# Patient Record
Sex: MALE | Employment: OTHER | URBAN - NONMETROPOLITAN AREA
[De-identification: names, ages, dates, MRNs, and addresses within clinical notes are randomized per-mention and may not be internally consistent; named-entity substitution may affect disease eponyms.]

---

## 2019-03-11 ENCOUNTER — NON-PROVIDER VISIT (OUTPATIENT)
Dept: URGENT CARE | Facility: PHYSICIAN GROUP | Age: 64
End: 2019-03-11
Payer: COMMERCIAL

## 2019-03-11 ENCOUNTER — TELEPHONE (OUTPATIENT)
Dept: URGENT CARE | Facility: PHYSICIAN GROUP | Age: 64
End: 2019-03-11

## 2019-03-11 ENCOUNTER — OFFICE VISIT (OUTPATIENT)
Dept: URGENT CARE | Facility: PHYSICIAN GROUP | Age: 64
End: 2019-03-11
Payer: COMMERCIAL

## 2019-03-11 ENCOUNTER — APPOINTMENT (OUTPATIENT)
Dept: RADIOLOGY | Facility: IMAGING CENTER | Age: 64
End: 2019-03-11
Attending: PHYSICIAN ASSISTANT
Payer: COMMERCIAL

## 2019-03-11 VITALS
SYSTOLIC BLOOD PRESSURE: 138 MMHG | TEMPERATURE: 97.9 F | OXYGEN SATURATION: 96 % | WEIGHT: 196 LBS | BODY MASS INDEX: 28.06 KG/M2 | HEIGHT: 70 IN | DIASTOLIC BLOOD PRESSURE: 82 MMHG | RESPIRATION RATE: 14 BRPM | HEART RATE: 100 BPM

## 2019-03-11 DIAGNOSIS — G89.29 CHRONIC PAIN OF LEFT KNEE: ICD-10-CM

## 2019-03-11 DIAGNOSIS — M25.562 CHRONIC PAIN OF LEFT KNEE: ICD-10-CM

## 2019-03-11 PROCEDURE — 73562 X-RAY EXAM OF KNEE 3: CPT | Mod: TC,FY,LT | Performed by: PHYSICIAN ASSISTANT

## 2019-03-11 PROCEDURE — 99204 OFFICE O/P NEW MOD 45 MIN: CPT | Performed by: PHYSICIAN ASSISTANT

## 2019-03-11 RX ORDER — MELOXICAM 15 MG/1
15 TABLET ORAL DAILY
Qty: 30 TAB | Refills: 0 | Status: SHIPPED | OUTPATIENT
Start: 2019-03-11 | End: 2019-07-31

## 2019-03-11 NOTE — PROGRESS NOTES
Chief Complaint   Patient presents with   • Knee Pain       HISTORY OF PRESENT ILLNESS: Patient is a 64 y.o. male who presents today because he has had left knee pain waxing and waning for the last several weeks.  He does report chronic knee pain intermittently for many years.  No recent trauma to the area.  Denies any distal paresthesias.  He tried some Aleve which did not help.  He has pain with extension and with flexion    There are no active problems to display for this patient.      Allergies:Lisinopril    Current Outpatient Prescriptions Ordered in [facility]   Medication Sig Dispense Refill   • meloxicam (MOBIC) 15 MG tablet Take 1 Tab by mouth every day. 30 Tab 0   • fluoxetine (PROZAC) 10 MG Cap Take 10 mg by mouth every day.     • losartan-hydrochlorothiazide (HYZAAR) 100-12.5 MG per tablet Take 1 Tab by mouth every day.     • metoprolol SR (TOPROL XL) 50 MG TABLET SR 24 HR Take 50 mg by mouth every day.     • buPROPion (WELLBUTRIN XL) 300 MG XL tablet Take 300 mg by mouth every morning.     • ibuprofen (MOTRIN) 800 MG Tab Take 800 mg by mouth every 8 hours as needed.     • naproxen (NAPROSYN) 250 MG Tab Take 250 mg by mouth 2 times a day, with meals.     • aspirin EC (ECOTRIN) 81 MG Tablet Delayed Response Take 81 mg by mouth every day.     • diphenhydrAMINE (BENADRYL) 25 MG Tab Take 25 mg by mouth every 6 hours as needed for Sleep.     • ranitidine (ZANTAC) 150 MG Tab Take 150 mg by mouth 2 times a day.       No current [facility]-ordered facility-administered medications on file.        Past Medical History:   Diagnosis Date   • Depression    • GERD (gastroesophageal reflux disease)    • Hypertension        Social History   Substance Use Topics   • Smoking status: Former Smoker   • Smokeless tobacco: Never Used   • Alcohol use Not on file       Family Status   Relation Status   • Mo    • Fa    No family history on file.    ROS:  Review of Systems   Constitutional: Negative for fever, chills, weight  "loss and malaise/fatigue.   HENT: Negative for ear pain, nosebleeds, congestion, sore throat and neck pain.    Eyes: Negative for blurred vision.   Respiratory: Negative for cough, sputum production, shortness of breath and wheezing.    Cardiovascular: Negative for chest pain, palpitations, orthopnea and leg swelling.   Gastrointestinal: Negative for heartburn, nausea, vomiting and abdominal pain.   Genitourinary: Negative for dysuria, urgency and frequency.     Exam:  Blood pressure 138/82, pulse 100, temperature 36.6 °C (97.9 °F), temperature source Temporal, resp. rate 14, height 1.778 m (5' 10\"), weight 88.9 kg (196 lb), SpO2 96 %.  General:  Well nourished, well developed male in NAD  Head:Normocephalic, atraumatic  Eyes: PERRLA, EOM within normal limits, no conjunctival injection, no scleral icterus, visual fields and acuity grossly intact.  Extremities: no clubbing, cyanosis, or edema.  Left knee is without any visual deformity, erythema, edema or ecchymosis.  He has some tenderness to the joint line anteriorly and medially.  He has good distal circulation and sensation, somewhat reduced range of motion with extension secondary to pain.    Please note that this dictation was created using voice recognition software. I have made every reasonable attempt to correct obvious errors, but I expect that there are errors of grammar and possibly content that I did not discover before finalizing the note.    Assessment/Plan:  1. Chronic pain of left knee  DX-KNEE 3 VIEWS LEFT    meloxicam (MOBIC) 15 MG tablet       Followup with primary care in the next 7-10 days if not significantly improving, return to the urgent care or go to the emergency room sooner for any worsening of symptoms.       "

## 2019-04-29 ENCOUNTER — OFFICE VISIT (OUTPATIENT)
Dept: URGENT CARE | Facility: PHYSICIAN GROUP | Age: 64
End: 2019-04-29
Payer: COMMERCIAL

## 2019-04-29 VITALS
RESPIRATION RATE: 16 BRPM | BODY MASS INDEX: 28.06 KG/M2 | HEART RATE: 84 BPM | TEMPERATURE: 97.6 F | SYSTOLIC BLOOD PRESSURE: 134 MMHG | OXYGEN SATURATION: 99 % | DIASTOLIC BLOOD PRESSURE: 88 MMHG | HEIGHT: 70 IN | WEIGHT: 196 LBS

## 2019-04-29 DIAGNOSIS — G89.29 CHRONIC PAIN OF LEFT KNEE: ICD-10-CM

## 2019-04-29 DIAGNOSIS — M25.562 CHRONIC PAIN OF LEFT KNEE: ICD-10-CM

## 2019-04-29 PROCEDURE — 99214 OFFICE O/P EST MOD 30 MIN: CPT | Performed by: PHYSICIAN ASSISTANT

## 2019-04-29 RX ORDER — MELOXICAM 15 MG/1
15 TABLET ORAL DAILY
Qty: 30 TAB | Refills: 0 | Status: SHIPPED | OUTPATIENT
Start: 2019-04-29 | End: 2019-07-31

## 2019-04-29 NOTE — PROGRESS NOTES
Chief Complaint   Patient presents with   • Knee Pain       HISTORY OF PRESENT ILLNESS: Patient is a 64 y.o. male who presents today because he has chronic pain of his left knee.  He has been seen for this about a month ago, prescribed Mobic which seemed to help.  He also had an x-ray which showed:    1.  There is degenerative spurring along the anterior superior aspect of the patella at the quadriceps attachment.    He reports that the Mobic did help at some point but he would like to see an orthopedic physician for further evaluation and treatment    There are no active problems to display for this patient.      Allergies:Lisinopril    Current Outpatient Prescriptions Ordered in [facility]   Medication Sig Dispense Refill   • meloxicam (MOBIC) 15 MG tablet Take 1 Tab by mouth every day. 30 Tab 0   • meloxicam (MOBIC) 15 MG tablet Take 1 Tab by mouth every day. 30 Tab 0   • fluoxetine (PROZAC) 10 MG Cap Take 10 mg by mouth every day.     • losartan-hydrochlorothiazide (HYZAAR) 100-12.5 MG per tablet Take 1 Tab by mouth every day.     • ibuprofen (MOTRIN) 800 MG Tab Take 800 mg by mouth every 8 hours as needed.     • naproxen (NAPROSYN) 250 MG Tab Take 250 mg by mouth 2 times a day, with meals.     • metoprolol SR (TOPROL XL) 50 MG TABLET SR 24 HR Take 50 mg by mouth every day.     • aspirin EC (ECOTRIN) 81 MG Tablet Delayed Response Take 81 mg by mouth every day.     • buPROPion (WELLBUTRIN XL) 300 MG XL tablet Take 300 mg by mouth every morning.     • diphenhydrAMINE (BENADRYL) 25 MG Tab Take 25 mg by mouth every 6 hours as needed for Sleep.     • ranitidine (ZANTAC) 150 MG Tab Take 150 mg by mouth 2 times a day.       No current [facility]-ordered facility-administered medications on file.        Past Medical History:   Diagnosis Date   • Depression    • GERD (gastroesophageal reflux disease)    • Hypertension        Social History   Substance Use Topics   • Smoking status: Former Smoker   • Smokeless tobacco: Never Used   •  "Alcohol use Not on file       Family Status   Relation Status   • Mo    • Fa    No family history on file.    ROS:  Review of Systems   Constitutional: Negative for fever, chills, weight loss and malaise/fatigue.   HENT: Negative for ear pain, nosebleeds, congestion, sore throat and neck pain.    Eyes: Negative for blurred vision.   Respiratory: Negative for cough, sputum production, shortness of breath and wheezing.    Cardiovascular: Negative for chest pain, palpitations, orthopnea and leg swelling.   Gastrointestinal: Negative for heartburn, nausea, vomiting and abdominal pain.   Genitourinary: Negative for dysuria, urgency and frequency.     Exam:  /88 (BP Location: Right arm, Patient Position: Sitting, BP Cuff Size: Adult)   Pulse 84   Temp 36.4 °C (97.6 °F) (Temporal)   Resp 16   Ht 1.778 m (5' 10\")   Wt 88.9 kg (196 lb)   SpO2 99%   General:  Well nourished, well developed male in NAD  Head:Normocephalic, atraumatic  Eyes: PERRLA, EOM within normal limits, no conjunctival injection, no scleral icterus, visual fields and acuity grossly intact.  Extremities: no clubbing, cyanosis, or edema.    Please note that this dictation was created using voice recognition software. I have made every reasonable attempt to correct obvious errors, but I expect that there are errors of grammar and possibly content that I did not discover before finalizing the note.    Assessment/Plan:  1. Chronic pain of left knee  meloxicam (MOBIC) 15 MG tablet    REFERRAL TO ORTHOPEDICS       Followup with primary care in the next 7-10 days if not significantly improving, return to the urgent care or go to the emergency room sooner for any worsening of symptoms.       "

## 2019-07-31 ENCOUNTER — OFFICE VISIT (OUTPATIENT)
Dept: URGENT CARE | Facility: PHYSICIAN GROUP | Age: 64
End: 2019-07-31
Payer: COMMERCIAL

## 2019-07-31 VITALS
OXYGEN SATURATION: 97 % | HEIGHT: 70 IN | DIASTOLIC BLOOD PRESSURE: 86 MMHG | HEART RATE: 84 BPM | BODY MASS INDEX: 29.2 KG/M2 | TEMPERATURE: 97.4 F | WEIGHT: 204 LBS | SYSTOLIC BLOOD PRESSURE: 156 MMHG | RESPIRATION RATE: 16 BRPM

## 2019-07-31 DIAGNOSIS — G89.29 CHRONIC PAIN OF LEFT KNEE: ICD-10-CM

## 2019-07-31 DIAGNOSIS — M25.562 CHRONIC PAIN OF LEFT KNEE: ICD-10-CM

## 2019-07-31 PROCEDURE — 99214 OFFICE O/P EST MOD 30 MIN: CPT | Performed by: NURSE PRACTITIONER

## 2019-07-31 RX ORDER — MELOXICAM 15 MG/1
15 TABLET ORAL DAILY
Qty: 30 TAB | Refills: 0 | Status: SHIPPED | OUTPATIENT
Start: 2019-07-31 | End: 2019-10-23 | Stop reason: SDUPTHER

## 2019-07-31 NOTE — PROGRESS NOTES
Chief Complaint   Patient presents with   • Arthritis       HISTORY OF PRESENT ILLNESS: Patient is a 64 y.o. male who presents to urgent care today with complaints of chronic knee pain, left sided. For the past 10 months he has had intermittent pain. He denies any significant injury or trauma.  Notes that he is seen in orthopedic physician for his pain, provided with Mobic improvement.  He denies any worsening but notes that he is out of the medication.  He denies any fever, chills, malaise.  His pain is consistent with his chronic pain.  He does not have a PCP for follow-up.    There are no active problems to display for this patient.      Allergies:Lisinopril    Current Outpatient Medications Ordered in [facility]   Medication Sig Dispense Refill   • meloxicam (MOBIC) 15 MG tablet Take 1 Tab by mouth every day. Take with food. 30 Tab 0   • fluoxetine (PROZAC) 10 MG Cap Take 10 mg by mouth every day.     • losartan-hydrochlorothiazide (HYZAAR) 100-12.5 MG per tablet Take 1 Tab by mouth every day.     • naproxen (NAPROSYN) 250 MG Tab Take 250 mg by mouth 2 times a day, with meals.     • metoprolol SR (TOPROL XL) 50 MG TABLET SR 24 HR Take 50 mg by mouth every day.     • aspirin EC (ECOTRIN) 81 MG Tablet Delayed Response Take 81 mg by mouth every day.     • buPROPion (WELLBUTRIN XL) 300 MG XL tablet Take 300 mg by mouth every morning.     • diphenhydrAMINE (BENADRYL) 25 MG Tab Take 25 mg by mouth every 6 hours as needed for Sleep.     • ranitidine (ZANTAC) 150 MG Tab Take 150 mg by mouth 2 times a day.       No current [facility]-ordered facility-administered medications on file.        Past Medical History:   Diagnosis Date   • Depression    • GERD (gastroesophageal reflux disease)    • Hypertension        Social History     Tobacco Use   • Smoking status: Former Smoker   • Smokeless tobacco: Never Used   Substance Use Topics   • Alcohol use: Not on file   • Drug use: Not on file       Family Status   Relation Name Status   •  "Mo     • Fa     History reviewed. No pertinent family history.    ROS:  Review of Systems   Constitutional: Negative for fever, chills, weight loss, malaise, and fatigue.   HENT: Negative for ear pain, nosebleeds, congestion, sore throat and neck pain.    Eyes: Negative for vision changes.   Neuro: Negative for headache, sensory changes, weakness, seizure, LOC.   Cardiovascular: Negative for chest pain, palpitations, orthopnea and leg swelling.   Respiratory: Negative for cough, sputum production, shortness of breath and wheezing.   Gastrointestinal: Negative for abdominal pain, nausea, vomiting or diarrhea.   Genitourinary: Negative for dysuria, urgency and frequency.  Musculoskeletal: Positive for knee pain.  Negative for falls, neck pain, back pain, myalgias.   Skin: Negative for rash, diaphoresis.     Exam:  /86 (BP Location: Right arm, Patient Position: Sitting, BP Cuff Size: Adult)   Pulse 84   Temp 36.3 °C (97.4 °F) (Temporal)   Resp 16   Ht 1.778 m (5' 10\")   Wt 92.5 kg (204 lb)   SpO2 97%   General: well-nourished, well-developed male in NAD  Head: normocephalic, atraumatic  Eyes: PERRLA, no conjunctival injection, acuity grossly intact, lids normal.  Ears: normal shape and symmetry, no tenderness, no discharge. External canals are without any significant edema or erythema. Gross auditory acuity is intact.  Nose: symmetrical without tenderness, no discharge.  Mouth/Throat: reasonable hygiene, no erythema, exudates or tonsillar enlargement.  Neck: no masses, range of motion within normal limits, no tracheal deviation. No obvious thyroid enlargement.   Lymph: no cervical adenopathy. No supraclavicular adenopathy.   Neuro: alert and oriented. Cranial nerves 1-12 grossly intact. No sensory deficit.   Cardiovascular: regular rate and rhythm. No edema.  Pulmonary: no distress. Chest is symmetrical with respiration, no wheezes, crackles, or rhonchi.   Musculoskeletal: no clubbing, " appropriate muscle tone, gait is stable.  Left knee is normal in appearance, there is generalized tenderness throughout without any specific tenderness noted.  He has full range of motion, no obvious laxity.  Skin: warm, dry, intact, no clubbing, no cyanosis, no rashes.   Psych: appropriate mood, affect, judgement.         Assessment/Plan:  1. Chronic pain of left knee  meloxicam (MOBIC) 15 MG tablet       Mobic as directed, take with food.  Follow-up with orthopedic physician.  Supportive care, differential diagnoses, and indications for immediate follow-up discussed with patient.   Pathogenesis of diagnosis discussed including typical length and natural progression.   Instructed to return to clinic or nearest emergency department for any change in condition, further concerns, or worsening of symptoms.  Patient states understanding of the plan of care and discharge instructions.  Instructed to make an appointment, to establish care and for follow up, with a primary care provider.        Please note that this dictation was created using voice recognition software. I have made every reasonable attempt to correct obvious errors, but I expect that there are errors of grammar and possibly content that I did not discover before finalizing the note.      SAMANTHA Xie.

## 2019-08-08 ENCOUNTER — OFFICE VISIT (OUTPATIENT)
Dept: MEDICAL GROUP | Facility: PHYSICIAN GROUP | Age: 64
End: 2019-08-08
Payer: COMMERCIAL

## 2019-08-08 VITALS
RESPIRATION RATE: 16 BRPM | HEART RATE: 80 BPM | OXYGEN SATURATION: 98 % | BODY MASS INDEX: 28.92 KG/M2 | HEIGHT: 70 IN | DIASTOLIC BLOOD PRESSURE: 88 MMHG | SYSTOLIC BLOOD PRESSURE: 156 MMHG | TEMPERATURE: 98.1 F | WEIGHT: 202 LBS

## 2019-08-08 DIAGNOSIS — Z12.11 COLON CANCER SCREENING: ICD-10-CM

## 2019-08-08 DIAGNOSIS — F34.1 DYSTHYMIA: ICD-10-CM

## 2019-08-08 DIAGNOSIS — E78.5 HYPERLIPIDEMIA, UNSPECIFIED HYPERLIPIDEMIA TYPE: ICD-10-CM

## 2019-08-08 DIAGNOSIS — M72.2 PLANTAR FASCIITIS, BILATERAL: ICD-10-CM

## 2019-08-08 DIAGNOSIS — M1A.09X0 CHRONIC GOUT OF MULTIPLE SITES, UNSPECIFIED CAUSE: ICD-10-CM

## 2019-08-08 DIAGNOSIS — M17.12 PRIMARY OSTEOARTHRITIS OF LEFT KNEE: Chronic | ICD-10-CM

## 2019-08-08 DIAGNOSIS — I10 ESSENTIAL HYPERTENSION: ICD-10-CM

## 2019-08-08 PROBLEM — M10.9 GOUT OF MULTIPLE SITES: Status: ACTIVE | Noted: 2019-08-08

## 2019-08-08 PROCEDURE — 99204 OFFICE O/P NEW MOD 45 MIN: CPT | Performed by: INTERNAL MEDICINE

## 2019-08-08 SDOH — HEALTH STABILITY: MENTAL HEALTH: HOW MANY STANDARD DRINKS CONTAINING ALCOHOL DO YOU HAVE ON A TYPICAL DAY?: 3 OR 4

## 2019-08-08 SDOH — HEALTH STABILITY: MENTAL HEALTH: HOW OFTEN DO YOU HAVE A DRINK CONTAINING ALCOHOL?: 4 OR MORE TIMES A WEEK

## 2019-08-08 SDOH — HEALTH STABILITY: MENTAL HEALTH: HOW OFTEN DO YOU HAVE 6 OR MORE DRINKS ON ONE OCCASION?: LESS THAN MONTHLY

## 2019-08-08 ASSESSMENT — PATIENT HEALTH QUESTIONNAIRE - PHQ9: CLINICAL INTERPRETATION OF PHQ2 SCORE: 0

## 2019-08-08 NOTE — ASSESSMENT & PLAN NOTE
Patient has been on meloxicam in the past for left knee pain.  He has been off this x 6 mos, he reports he was fine until kneeling x a few days.  He reports he did use kneepads.  Significantly.  Patient states that 6 months ago he generally felt awful.  He was vomiting bright red blood.  He therefore self discontinued all of his medications.  He had resolution of the nausea and subsequently has felt better than he has in years.  Patient reports he has seen orthopedics at [place], x-rays were done, he was recommended conservative management.

## 2019-08-08 NOTE — ASSESSMENT & PLAN NOTE
Patient is here to establish, he has in the past followed with MD in [place].  He reports recently felt awful so stopped taking all his medications and began to feel much better.  He has been on amlodipine 10 mg a day and losartan 100mg a day, but stopped medications about 6 mos ago.  He states he feels much less tired off the medications. No chest pain palpitations or edema.  Not following bp at home.

## 2019-08-08 NOTE — PROGRESS NOTES
Chief Complaint   Patient presents with   • Foot Problem     referral for orthotics    • Knee Pain     bilateral knee pain due to arthiritis    • Eczema     dry skin on bilateral hands        HISTORY OF PRESENT ILLNESS: Patient is a 64 y.o. male New patient who presents today to discuss the medical issues below.    Essential hypertension  Patient is here to establish, he has in the past followed with MD in [place].  He reports recently felt awful so stopped taking all his medications and began to feel much better.  He has been on amlodipine 10 mg a day and losartan 100mg a day, but stopped medications about 6 mos ago.  He states he feels much less tired off the medications. No chest pain palpitations or edema.  Not following bp at home.      Gout of multiple sites  Patient reports history of gout.  Has colchicine for as needed breakthrough.  He reports usually affected on the toes and right thumb.  Last episode of gout approximately 6 months ago.    Primary osteoarthritis of left knee  Patient has been on meloxicam in the past for left knee pain.  He has been off this x 6 mos, he reports he was fine until kneeling x a few days.  He reports he did use kneepads.  Significantly.  Patient states that 6 months ago he generally felt awful.  He was vomiting bright red blood.  He therefore self discontinued all of his medications.  He had resolution of the nausea and subsequently has felt better than he has in years.  Patient reports he has seen orthopedics at [place], x-rays were done, he was recommended conservative management.    Dysthymia  Patient reports 10-15 years of depression, has been on medications as fluoxetine 20 mg a day and buspar 300 mg a day until 6 mos ago when he self discontinued abruptly.  He denies any withdrawal type symptoms.  He has his wife watching him to make sure he is not having depression exacerbation and reports she has not noted any substantial changes.  Patient reports lifelong fighting  depression with anger, sleeping and withdrawn.  He reports he didn't feel remarkably improved with meds.  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      Plantar fasciitis, bilateral  Patient reports history of plantar fasciitis, utilizes orthotics, has had some exacerbation of heel pain.      Patient Active Problem List    Diagnosis Date Noted   • Essential hypertension 2019   • Gout of multiple sites 2019   • Primary osteoarthritis of left knee 2019   • Dysthymia 2019   • Plantar fasciitis, bilateral 2019       Allergies:Lisinopril    Current Outpatient Medications   Medication Sig Dispense Refill       0                                               No current facility-administered medications for this visit.          Past Medical History:   Diagnosis Date   • Depression    • GERD (gastroesophageal reflux disease)    • Hypertension        Social History     Tobacco Use   • Smoking status: Current Some Day Smoker     Years: 30.00     Types: Cigars   • Smokeless tobacco: Never Used   Substance Use Topics   • Alcohol use: Yes     Frequency: 4 or more times a week     Drinks per session: 3 or 4     Binge frequency: Less than monthly   • Drug use: Never       Family Status   Relation Name Status   • Mo  (Not Specified)   • Fa     • Sis     • Child  (Not Specified)     Family History   Problem Relation Age of Onset   • Lung Disease Mother    • Stroke Father    • Hyperlipidemia Father    • Lung Disease Sister    • Alcohol abuse Child        ROS:    Respiratory: Negative for cough, sputum production, shortness of breath or wheezing.    Cardiovascular: Negative for chest pain, palpitations, orthopnea, dyspnea with exertion or edema.   Gastrointestinal: Negative for GI upset, nausea, vomiting, abdominal pain, constipation or diarrhea.   Genitourinary: Negative for dysuria, urgency, hesitancy or frequency.       Exam:    /88 (BP Location: Left arm,  "Patient Position: Sitting, BP Cuff Size: Small adult)   Pulse 80   Temp 36.7 °C (98.1 °F) (Temporal)   Resp 16   Ht 1.778 m (5' 10\")   Wt 91.6 kg (202 lb)   SpO2 98%   General:  Well nourished, well developed male in NAD.  HENT: Normocephalic, bilateral TMs are intact, nasal and oral mucosa with no lesions,   Neck: Supple without bruit. Thyroid is not enlarged.  Pulmonary: Clear to ausculation and percussion.  Normal effort. No rales, rhonchi, or wheezing.  Cardiovascular: Regular rate and rhythm without murmur.   Abdomen: Normal bowel sounds soft and nontender no palpable liver spleen bladder mass.  Extremities: No LE edema noted.  Neuro: Grossly nonfocal.  Psych: Alert and oriented to person, place, and time. Appropriate mood and conversation.        This dictation was created using voice recognition software. I have made reasonable attempts to correct errors, however, errors of grammar and content may exist.          Assessment/Plan:    1. Essential hypertension  Blood pressure.  Currently not taking any antihypertensives, no secondary symptoms.  Patient opts for blood pressure monitoring coming by the clinic 1-2 times a week for the next several weeks to determine if indeed antihypertensives are indicated.  If blood pressure remaining above goal consideration for restart low-dose losartan  - Comp Metabolic Panel; Future  - CBC WITH DIFFERENTIAL; Future    2. Chronic gout of multiple sites, unspecified cause  No recent outbreak.  Check uric acid levels.  Discussed PRN colchicine  - URIC ACID, SERUM    3. Primary osteoarthritis of left knee  Discussed implications of meloxicam.  By history is difficult to know if this was contributing to a GI bleed or not.  He is pretty adamant that the meloxicam was prescribed after his vomiting of blood.  Discussed ill advisability of utilizing meloxicam however he states this is working and wants to monitor with only PRN use.  Will check stool cards.    4. " Dysthymia  Stable off medications although he does have a pretty prominent and long standing history of antidepressant use.  Monitor off medications for now per patient's request.  No evidence of significant depression currently.    5. Plantar fasciitis, bilateral  Referral to podiatry meloxicam  - REFERRAL TO PODIATRY    6. Hyperlipidemia, unspecified hyperlipidemia type  For completeness assess cholesterol levels  - Lipid Profile; Future    7. Colon cancer screening  Considering questionable history of GI bleeding check stool cards.  By history he is current with colonoscopy screening  - OCCULT BLOOD FECES IMMUNOASSAY; Future    Patient was seen for 45 minutes face to face of which more than 50% of the time was spent in counseling and coordination of care regarding the above problems.

## 2019-08-08 NOTE — ASSESSMENT & PLAN NOTE
Patient reports history of gout.  Has colchicine for as needed breakthrough.  He reports usually affected on the toes and right thumb.  Last episode of gout approximately 6 months ago.

## 2019-08-08 NOTE — PATIENT INSTRUCTIONS
Lotion use as much as possible     Labs and follow ov 6-8 weeks  bp checks 1-2 x a week    Podiatry    Use colchicine if gout  meloxicam with food if arthritis, watch for upset stomach    Stool card x1

## 2019-08-08 NOTE — ASSESSMENT & PLAN NOTE
Patient reports history of plantar fasciitis, utilizes orthotics, has had some exacerbation of heel pain.

## 2019-09-16 ENCOUNTER — HOSPITAL ENCOUNTER (OUTPATIENT)
Facility: MEDICAL CENTER | Age: 64
End: 2019-09-16
Attending: INTERNAL MEDICINE
Payer: COMMERCIAL

## 2019-09-16 PROCEDURE — 82274 ASSAY TEST FOR BLOOD FECAL: CPT

## 2019-09-17 ENCOUNTER — HOSPITAL ENCOUNTER (OUTPATIENT)
Dept: LAB | Facility: MEDICAL CENTER | Age: 64
End: 2019-09-17
Attending: INTERNAL MEDICINE
Payer: COMMERCIAL

## 2019-09-17 DIAGNOSIS — E78.5 HYPERLIPIDEMIA, UNSPECIFIED HYPERLIPIDEMIA TYPE: ICD-10-CM

## 2019-09-17 DIAGNOSIS — I10 ESSENTIAL HYPERTENSION: ICD-10-CM

## 2019-09-17 LAB
ALBUMIN SERPL BCP-MCNC: 5.1 G/DL (ref 3.2–4.9)
ALBUMIN/GLOB SERPL: 2 G/DL
ALP SERPL-CCNC: 63 U/L (ref 30–99)
ALT SERPL-CCNC: 25 U/L (ref 2–50)
ANION GAP SERPL CALC-SCNC: 8 MMOL/L (ref 0–11.9)
AST SERPL-CCNC: 25 U/L (ref 12–45)
BASOPHILS # BLD AUTO: 0.8 % (ref 0–1.8)
BASOPHILS # BLD: 0.05 K/UL (ref 0–0.12)
BILIRUB SERPL-MCNC: 0.7 MG/DL (ref 0.1–1.5)
BUN SERPL-MCNC: 18 MG/DL (ref 8–22)
CALCIUM SERPL-MCNC: 9.7 MG/DL (ref 8.5–10.5)
CHLORIDE SERPL-SCNC: 105 MMOL/L (ref 96–112)
CHOLEST SERPL-MCNC: 202 MG/DL (ref 100–199)
CO2 SERPL-SCNC: 26 MMOL/L (ref 20–33)
CREAT SERPL-MCNC: 1.19 MG/DL (ref 0.5–1.4)
EOSINOPHIL # BLD AUTO: 0.3 K/UL (ref 0–0.51)
EOSINOPHIL NFR BLD: 4.6 % (ref 0–6.9)
ERYTHROCYTE [DISTWIDTH] IN BLOOD BY AUTOMATED COUNT: 43.9 FL (ref 35.9–50)
GLOBULIN SER CALC-MCNC: 2.6 G/DL (ref 1.9–3.5)
GLUCOSE SERPL-MCNC: 96 MG/DL (ref 65–99)
HCT VFR BLD AUTO: 45.8 % (ref 42–52)
HDLC SERPL-MCNC: 53 MG/DL
HGB BLD-MCNC: 15.4 G/DL (ref 14–18)
IMM GRANULOCYTES # BLD AUTO: 0.05 K/UL (ref 0–0.11)
IMM GRANULOCYTES NFR BLD AUTO: 0.8 % (ref 0–0.9)
LDLC SERPL CALC-MCNC: 120 MG/DL
LYMPHOCYTES # BLD AUTO: 1.37 K/UL (ref 1–4.8)
LYMPHOCYTES NFR BLD: 21 % (ref 22–41)
MCH RBC QN AUTO: 31.9 PG (ref 27–33)
MCHC RBC AUTO-ENTMCNC: 33.6 G/DL (ref 33.7–35.3)
MCV RBC AUTO: 94.8 FL (ref 81.4–97.8)
MONOCYTES # BLD AUTO: 0.86 K/UL (ref 0–0.85)
MONOCYTES NFR BLD AUTO: 13.2 % (ref 0–13.4)
NEUTROPHILS # BLD AUTO: 3.89 K/UL (ref 1.82–7.42)
NEUTROPHILS NFR BLD: 59.6 % (ref 44–72)
NRBC # BLD AUTO: 0 K/UL
NRBC BLD-RTO: 0 /100 WBC
PLATELET # BLD AUTO: 233 K/UL (ref 164–446)
PMV BLD AUTO: 11.2 FL (ref 9–12.9)
POTASSIUM SERPL-SCNC: 4.9 MMOL/L (ref 3.6–5.5)
PROT SERPL-MCNC: 7.7 G/DL (ref 6–8.2)
RBC # BLD AUTO: 4.83 M/UL (ref 4.7–6.1)
SODIUM SERPL-SCNC: 139 MMOL/L (ref 135–145)
TRIGL SERPL-MCNC: 143 MG/DL (ref 0–149)
URATE SERPL-MCNC: 8.6 MG/DL (ref 2.5–8.3)
WBC # BLD AUTO: 6.5 K/UL (ref 4.8–10.8)

## 2019-09-17 PROCEDURE — 80053 COMPREHEN METABOLIC PANEL: CPT

## 2019-09-17 PROCEDURE — 36415 COLL VENOUS BLD VENIPUNCTURE: CPT

## 2019-09-17 PROCEDURE — 84550 ASSAY OF BLOOD/URIC ACID: CPT

## 2019-09-17 PROCEDURE — 85025 COMPLETE CBC W/AUTO DIFF WBC: CPT

## 2019-09-17 PROCEDURE — 80061 LIPID PANEL: CPT

## 2019-09-18 DIAGNOSIS — Z12.11 COLON CANCER SCREENING: ICD-10-CM

## 2019-09-19 LAB — HEMOCCULT STL QL IA: NEGATIVE

## 2019-10-15 ENCOUNTER — OFFICE VISIT (OUTPATIENT)
Dept: MEDICAL GROUP | Facility: PHYSICIAN GROUP | Age: 64
End: 2019-10-15
Payer: COMMERCIAL

## 2019-10-15 VITALS
RESPIRATION RATE: 14 BRPM | WEIGHT: 193 LBS | SYSTOLIC BLOOD PRESSURE: 130 MMHG | BODY MASS INDEX: 27.63 KG/M2 | OXYGEN SATURATION: 98 % | DIASTOLIC BLOOD PRESSURE: 82 MMHG | HEART RATE: 70 BPM | TEMPERATURE: 98.3 F | HEIGHT: 70 IN

## 2019-10-15 DIAGNOSIS — I10 ESSENTIAL HYPERTENSION: ICD-10-CM

## 2019-10-15 DIAGNOSIS — M17.0 PRIMARY OSTEOARTHRITIS OF BOTH KNEES: ICD-10-CM

## 2019-10-15 DIAGNOSIS — F34.1 DYSTHYMIA: ICD-10-CM

## 2019-10-15 DIAGNOSIS — M1A.09X0 CHRONIC GOUT OF MULTIPLE SITES, UNSPECIFIED CAUSE: ICD-10-CM

## 2019-10-15 PROCEDURE — 99214 OFFICE O/P EST MOD 30 MIN: CPT | Performed by: INTERNAL MEDICINE

## 2019-10-15 NOTE — ASSESSMENT & PLAN NOTE
Knee pain as discussed above.  Patient has seen podiatry and dx with osteoarthritis.  Minimal use of the mobic.

## 2019-10-15 NOTE — ASSESSMENT & PLAN NOTE
Patient with a long history of depression, self discontinued medications and is remaining stable per patient.

## 2019-10-15 NOTE — PROGRESS NOTES
Chief Complaint   Patient presents with   • Labs Only     lab results    • Leg Pain     bilateral knee/ leg pain x 6 months   • Cold Symptoms     cough, congested x 2 weeks        HISTORY OF PRESENT ILLNESS: Patient is a 64 y.o. male established patient who presents today to discuss the medical issues below.    Gout of multiple sites  Patient reports intermittent aching bilateral knees.  Generally not at the same time. Had Ortho consult at [place] with pessimistic attitude towards knee replacement.  Patient reports 6 days per mo unable to walk on his knees and needs to just take it easy.  He is using the meloxicam only prn pain has been using 1/2 pill as the whole pill gave him diarrhea.  That was helpful.  He does not identify knee pain as gout pain.     Primary osteoarthritis of both knees  Knee pain as discussed above.  Patient has seen podiatry and dx with osteoarthritis.  Minimal use of the mobic.     Essential hypertension  Following at home and having readings in the 130/80 range at home, no chest pain palpation or edema.     Dysthymia  Patient with a long history of depression, self discontinued medications and is remaining stable per patient.      Patient Active Problem List    Diagnosis Date Noted   • Essential hypertension 08/08/2019   • Gout of multiple sites 08/08/2019   • Primary osteoarthritis of both knees 08/08/2019   • Dysthymia 08/08/2019   • Plantar fasciitis, bilateral 08/08/2019       Allergies:Lisinopril    Current Outpatient Medications   Medication Sig Dispense Refill   • meloxicam (MOBIC) 15 MG tablet Take 1 Tab by mouth every day. Take with food. 30 Tab 0     No current facility-administered medications for this visit.          Past Medical History:   Diagnosis Date   • Depression    • GERD (gastroesophageal reflux disease)    • Hypertension        Social History     Tobacco Use   • Smoking status: Current Some Day Smoker     Years: 30.00     Types: Cigars   • Smokeless tobacco: Never  "Used   Substance Use Topics   • Alcohol use: Yes     Frequency: 4 or more times a week     Drinks per session: 3 or 4     Binge frequency: Less than monthly   • Drug use: Never       Family Status   Relation Name Status   • Mo  (Not Specified)   • Fa     • Sis     • Child  (Not Specified)     Family History   Problem Relation Age of Onset   • Lung Disease Mother    • Stroke Father    • Hyperlipidemia Father    • Lung Disease Sister    • Alcohol abuse Child        ROS:    Respiratory: Negative for sputum production, shortness of breath or wheezing.    Cardiovascular: Negative for chest pain, palpitations, orthopnea, dyspnea with exertion or edema.   Gastrointestinal: Negative for GI upset, nausea, vomiting, abdominal pain, constipation or diarrhea.   Genitourinary: Negative for dysuria, urgency, hesitancy or frequency.       Exam:    /82 (BP Location: Left arm, Patient Position: Sitting, BP Cuff Size: Large adult)   Pulse 70   Temp 36.8 °C (98.3 °F) (Temporal)   Resp 14   Ht 1.778 m (5' 10\")   Wt 87.5 kg (193 lb)   SpO2 98%   General:  Well nourished, well developed male in NAD.  HENT: Normocephalic, bilateral TMs are intact, nasal and oral mucosa with no lesions,   Neck: Supple without bruit. Thyroid is not enlarged.  Pulmonary: Clear to ausculation and percussion.  Normal effort. No rales, rhonchi, or wheezing.  Cardiovascular: Regular rate and rhythm without murmur.   Abdomen: Normal bowel sounds soft and nontender no palpable liver spleen bladder mass.  Extremities: No LE edema noted.  J bilateral knee inspection reveal diffuse joint tenderness to palpation.  Full range of motion and no overt joint instability.  No effusion edema or warmth no erythema  Neuro: Grossly nonfocal.  Psych: Alert and oriented to person, place, and time. Appropriate mood and conversation.    LABS: Results reviewed and discussed with the patient, questions answered.      This dictation was created using voice " recognition software. I have made reasonable attempts to correct errors, however, errors of grammar and content may exist.          Assessment/Plan:    1. Chronic gout of multiple sites, unspecified cause  Elevated serum uric acid discussed at length with patient.  He has modified his diet he would like to monitor clinically prior to instituting preventative medications.  Discussed implications monitor.  - URIC ACID, SERUM    2. Primary osteoarthritis of both knees  Clinically consistent with osteoarthritis doubt gout is contributing.  Long discussion held.  Referral to orthopedics for second opinion on potential knee intervention such as Synvisc versus replacement etc. discussed cautious use of Mobic.  Stool cards are negative for any evidence of ongoing bleeding.  No anemia.  - REFERRAL TO ORTHOPEDICS    3. Essential hypertension  Remaining stable on no medications.  Discussed ongoing monitoring  - Comp Metabolic Panel; Future  - CBC WITH DIFFERENTIAL; Future    4. Dysthymia  Stable currently with no medications ongoing monitoring      Patient was seen for 25 minutes face to face of which more than 50% of the time was spent in counseling and coordination of care regarding the above problems.

## 2019-10-15 NOTE — ASSESSMENT & PLAN NOTE
Following at home and having readings in the 130/80 range at home, no chest pain palpation or edema.

## 2019-10-22 DIAGNOSIS — M25.562 CHRONIC PAIN OF LEFT KNEE: ICD-10-CM

## 2019-10-22 DIAGNOSIS — G89.29 CHRONIC PAIN OF LEFT KNEE: ICD-10-CM

## 2019-10-22 NOTE — TELEPHONE ENCOUNTER
Was the patient seen in the last year in this department? Yes    Does patient have an active prescription for medications requested? No     Received Request Via: Pharmacy     Last OV 10/15/19

## 2019-10-23 RX ORDER — MELOXICAM 15 MG/1
15 TABLET ORAL
Qty: 90 TAB | Refills: 1 | Status: SHIPPED | OUTPATIENT
Start: 2019-10-23

## 2020-02-14 ENCOUNTER — OFFICE VISIT (OUTPATIENT)
Dept: URGENT CARE | Facility: PHYSICIAN GROUP | Age: 65
End: 2020-02-14
Payer: MEDICARE

## 2020-02-14 VITALS
HEART RATE: 80 BPM | HEIGHT: 70 IN | TEMPERATURE: 98.3 F | RESPIRATION RATE: 16 BRPM | OXYGEN SATURATION: 97 % | BODY MASS INDEX: 27.77 KG/M2 | WEIGHT: 194 LBS | DIASTOLIC BLOOD PRESSURE: 88 MMHG | SYSTOLIC BLOOD PRESSURE: 146 MMHG

## 2020-02-14 DIAGNOSIS — R05.9 COUGH: ICD-10-CM

## 2020-02-14 DIAGNOSIS — B96.89 ACUTE BACTERIAL SINUSITIS: ICD-10-CM

## 2020-02-14 DIAGNOSIS — J01.90 ACUTE BACTERIAL SINUSITIS: ICD-10-CM

## 2020-02-14 LAB
FLUAV+FLUBV AG SPEC QL IA: NEGATIVE
INT CON NEG: NEGATIVE
INT CON POS: POSITIVE

## 2020-02-14 PROCEDURE — 99214 OFFICE O/P EST MOD 30 MIN: CPT | Performed by: PHYSICIAN ASSISTANT

## 2020-02-14 PROCEDURE — 87804 INFLUENZA ASSAY W/OPTIC: CPT | Performed by: PHYSICIAN ASSISTANT

## 2020-02-14 RX ORDER — AMOXICILLIN AND CLAVULANATE POTASSIUM 875; 125 MG/1; MG/1
1 TABLET, FILM COATED ORAL 2 TIMES DAILY
Qty: 14 TAB | Refills: 0 | Status: SHIPPED | OUTPATIENT
Start: 2020-02-14 | End: 2020-02-21

## 2020-02-14 NOTE — PROGRESS NOTES
Chief Complaint   Patient presents with   • URI       HISTORY OF PRESENT ILLNESS: Patient is a 65 y.o. male who presents today because he has a 7 to 10-day history of nasal congestion and a cough.  He now is having worsening nasal congestion with pain behind his eyes bilaterally.  He has been taking some over-the-counter medications without improvement    Patient Active Problem List    Diagnosis Date Noted   • Essential hypertension 2019   • Gout of multiple sites 2019   • Primary osteoarthritis of both knees 2019   • Dysthymia 2019   • Plantar fasciitis, bilateral 2019       Allergies:Lisinopril    Current Outpatient Medications Ordered in Epic   Medication Sig Dispense Refill   • amoxicillin-clavulanate (AUGMENTIN) 875-125 MG Tab Take 1 Tab by mouth 2 times a day for 7 days. 14 Tab 0   • meloxicam (MOBIC) 15 MG tablet Take 1 Tab by mouth 1 time daily as needed for Moderate Pain or Inflammation. Take with food. 90 Tab 1     No current Epic-ordered facility-administered medications on file.        Past Medical History:   Diagnosis Date   • Depression    • GERD (gastroesophageal reflux disease)    • Hypertension        Social History     Tobacco Use   • Smoking status: Current Some Day Smoker     Years: 30.00     Types: Cigars   • Smokeless tobacco: Never Used   Substance Use Topics   • Alcohol use: Yes     Frequency: 4 or more times a week     Drinks per session: 3 or 4     Binge frequency: Less than monthly   • Drug use: Never       Family Status   Relation Name Status   • Mo  (Not Specified)   • Fa     • Sis     • Child  (Not Specified)     Family History   Problem Relation Age of Onset   • Lung Disease Mother    • Stroke Father    • Hyperlipidemia Father    • Lung Disease Sister    • Alcohol abuse Child        ROS:  Review of Systems   Constitutional: Negative for fever, chills, weight loss and malaise/fatigue.   HENT: Negative for ear pain, nosebleeds, positive for  "congestion, sore throat and no neck pain.    Eyes: Negative for blurred vision.   Respiratory: Positive for cough, sputum production, no shortness of breath and wheezing.    Cardiovascular: Negative for chest pain, palpitations, orthopnea and leg swelling.   Gastrointestinal: Negative for heartburn, nausea, vomiting and abdominal pain.   Genitourinary: Negative for dysuria, urgency and frequency.     Exam:  /88 (BP Location: Right arm, Patient Position: Sitting, BP Cuff Size: Adult)   Pulse 80   Temp 36.8 °C (98.3 °F) (Temporal)   Resp 16   Ht 1.778 m (5' 10\")   Wt 88 kg (194 lb)   SpO2 97%   General:  Well nourished, well developed male in NAD  Head:Normocephalic, atraumatic  Eyes: PERRLA, EOM within normal limits, no conjunctival injection, no scleral icterus, visual fields and acuity grossly intact.  Ears: Normal shape and symmetry, no tenderness, no discharge. External canals are without any significant edema or erythema. Tympanic membranes are without any inflammation, no effusion. Gross auditory acuity is intact  Nose: Symmetrical without tenderness, no discharge.  Nasal mucosa on the right is erythematous and edematous with posterior nasal cavity exudate  Mouth: reasonable hygiene, no erythema exudates or tonsillar enlargement.  Neck: no masses, range of motion within normal limits, no tracheal deviation. No obvious thyroid enlargement.  Pulmonary: chest is symmetrical with respiration, no wheezes, crackles, or rhonchi.  Cardiovascular: regular rate and rhythm without murmurs, rubs, or gallops.  Extremities: no clubbing, cyanosis, or edema.    Influenza test is negative    Please note that this dictation was created using voice recognition software. I have made every reasonable attempt to correct obvious errors, but I expect that there are errors of grammar and possibly content that I did not discover before finalizing the note.    Assessment/Plan:  1. Acute bacterial sinusitis  " amoxicillin-clavulanate (AUGMENTIN) 875-125 MG Tab   2. Cough  POCT Influenza A/B   Over-the-counter cough cold medications as needed    Followup with primary care in the next 7-10 days if not significantly improving, return to the urgent care or go to the emergency room sooner for any worsening of symptoms.

## 2024-02-14 NOTE — ASSESSMENT & PLAN NOTE
Patient reports 10-15 years of depression, has been on medications as fluoxetine 20 mg a day and buspar 300 mg a day until 6 mos ago when he self discontinued abruptly.  He denies any withdrawal type symptoms.  He has his wife watching him to make sure he is not having depression exacerbation and reports she has not noted any substantial changes.  Patient reports lifelong fighting depression with anger, sleeping and withdrawn.  He reports he didn't feel remarkably improved with meds.  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     [Follow-Up] : a follow-up visit [Asthma] : asthma [Cough] : cough [Shortness of Breath] : shortness of breath [Wheezing] : wheezing